# Patient Record
Sex: MALE | Race: BLACK OR AFRICAN AMERICAN | Employment: FULL TIME | URBAN - METROPOLITAN AREA
[De-identification: names, ages, dates, MRNs, and addresses within clinical notes are randomized per-mention and may not be internally consistent; named-entity substitution may affect disease eponyms.]

---

## 2018-11-11 ENCOUNTER — HOSPITAL ENCOUNTER (EMERGENCY)
Age: 23
Discharge: HOME | End: 2018-11-11
Attending: EMERGENCY MEDICINE

## 2018-11-11 VITALS
TEMPERATURE: 98.9 F | OXYGEN SATURATION: 98 % | BODY MASS INDEX: 26.61 KG/M2 | WEIGHT: 200.8 LBS | RESPIRATION RATE: 16 BRPM | SYSTOLIC BLOOD PRESSURE: 131 MMHG | DIASTOLIC BLOOD PRESSURE: 78 MMHG | HEART RATE: 68 BPM | HEIGHT: 73 IN

## 2018-11-11 DIAGNOSIS — J40 BRONCHITIS: Primary | ICD-10-CM

## 2018-11-11 PROCEDURE — 99282 EMERGENCY DEPT VISIT SF MDM: CPT

## 2018-11-11 RX ORDER — BENZONATATE 100 MG/1
100 CAPSULE ORAL 3 TIMES DAILY PRN
Qty: 21 CAPSULE | Refills: 0 | Status: SHIPPED | OUTPATIENT
Start: 2018-11-11 | End: 2018-11-18

## 2018-11-11 NOTE — ED PROVIDER NOTES
CHIEF COMPLAINT  Cough      HISTORY OF PRESENT ILLNESS  Nilton Kelley is a 20 y.o. male, who presents to the ED with several day history of cough productive of clear sputum with symptoms worse at night not associated with chest pain wheezing or shortness of breath. No fever mild sore throat initially no earache. No prior history of asthma or use of an inhaler. Review of Systems    I have reviewed the following from the nursing documentation. Past Medical History:   Diagnosis Date    Wrist fracture, left     hx of casting     History reviewed. No pertinent surgical history. History reviewed. No pertinent family history. Social History     Social History    Marital status: Single     Spouse name: N/A    Number of children: N/A    Years of education: N/A     Occupational History    Not on file. Social History Main Topics    Smoking status: Current Every Day Smoker     Types: Cigars    Smokeless tobacco: Never Used    Alcohol use Yes      Comment: occ    Drug use: No    Sexual activity: Yes     Partners: Female     Other Topics Concern    Not on file     Social History Narrative    No narrative on file     No current facility-administered medications for this encounter. Current Outpatient Prescriptions   Medication Sig Dispense Refill    benzonatate (TESSALON PERLES) 100 MG capsule Take 1 capsule by mouth 3 times daily as needed for Cough 21 capsule 0    HYDROcodone-acetaminophen (NORCO) 5-325 MG per tablet Take 1 tablet by mouth every 6 hours as needed for Pain. 15 tablet 0     No Known Allergies  Review of Systems       PHYSICAL EXAM  /78   Pulse 68   Temp 98.9 °F (37.2 °C) (Oral)   Resp 16   Ht 6' 1\" (1.854 m)   Wt 91.1 kg (200 lb 12.8 oz)   SpO2 98%   BMI 26.49 kg/m²   GENERAL APPEARANCE: Awake and alert. Cooperative. In no acute distress. EYES: PERRL. Corneas clear. Sclera non icteric. No conjunctival injection  ENT: Oropharynx mild pharyngeal erythema no exudates.

## 2020-07-30 ENCOUNTER — HOSPITAL ENCOUNTER (EMERGENCY)
Age: 25
Discharge: HOME | End: 2020-07-30
Attending: EMERGENCY MEDICINE

## 2020-07-30 VITALS
BODY MASS INDEX: 27.44 KG/M2 | HEART RATE: 69 BPM | WEIGHT: 208 LBS | SYSTOLIC BLOOD PRESSURE: 128 MMHG | RESPIRATION RATE: 17 BRPM | TEMPERATURE: 99 F | DIASTOLIC BLOOD PRESSURE: 69 MMHG | OXYGEN SATURATION: 99 %

## 2020-07-30 PROCEDURE — 6370000000 HC RX 637 (ALT 250 FOR IP): Performed by: EMERGENCY MEDICINE

## 2020-07-30 PROCEDURE — 99282 EMERGENCY DEPT VISIT SF MDM: CPT

## 2020-07-30 RX ADMIN — CARBAMIDE PEROXIDE 6.5% 5 DROP: 6.5 LIQUID AURICULAR (OTIC) at 10:33

## 2020-07-30 NOTE — ED NOTES
1202: Pt seen walking out of Emergency Department by nurse and , patient left department, did not want to wait.      Jennifer Zimmer RN  07/30/20 8136

## 2020-07-30 NOTE — ED PROVIDER NOTES
CHIEF COMPLAINT  Otalgia (Pt c/o L ear fullness x 2-3 days. Pt c/o difficulty hearing.)      HISTORY OF PRESENT ILLNESS  Malinda Craft  is a 30 y.o. male who presents to the ED at via private vehicle complaining of otalgia. Patient reports that he has had left ear fullness for the last 2 or 3 days. Decreased hearing has been noted. Patient denies pain, drainage, or redness. No fevers or chills night. There are no other complaints, modifying factors or associated symptoms. Nursing notes reviewed. Past medical history:  has a past medical history of Wrist fracture, left. Past surgical history:  has no past surgical history on file. Home medications:   Prior to Admission medications    Not on File       No Known Allergies    Social history:  reports that he has been smoking cigars. He has never used smokeless tobacco. He reports current alcohol use. He reports that he does not use drugs. Family history:  History reviewed. No pertinent family history. REVIEW OF SYSTEMS  6 systems reviewed, pertinent positives per HPI otherwise noted to be negative    PHYSICAL EXAM  Vitals:    07/30/20 1010   BP: 128/69   Pulse: 69   Resp: 17   Temp: 99 °F (37.2 °C)   SpO2: 99%       GENERAL: Patient is well-developed, well-nourished,  no acute distress. Minimal apparent discomfort. Non toxic appearing. HEENT:  Normocephalic, atraumatic. PERRL. Conjunctiva appear normal.  External ears are normal.  Cerumen impaction bilaterally. Unable to visualize TMs.  MMM  NECK: Supple with normal ROM. Trachea midline  LUNGS:  Normal work of breathing. Speaking comfortably in full sentences. EXTREMITIES: 2+ distal pulses w/o edema. MUSCULOSKELETAL:  Atraumatic extremities with normal ROM grossly. No obvious bony deformities. SKIN: Warm/dry. No rashes/lesions noted. PSYCHIATRIC: Patient is alert and oriented with normal affect  NEUROLOGIC: Cranial nerves grossly intact.  Moves all extremities with equal strength. No gross sensory deficits. Answers questions/follows commands appropriately. ED COURSE/MDM  Nursing notes reviewed. Pt was given the following medications or treatments in the ED:              Cerumen disimpaction completed by nursing staff using Debrox as noted. Reevaluation:    12 PM: Easily able to visualize the left TM at this time. No evidence of erythema. Right TM is still somewhat obscured. Upon discussion with patient, patient became quite irritable stating \"I just came in to have you clean out my ears. All you did was put drops in my ears that I have at home. You are taking $1000 from me and not doing F)*&ing anything. I do not know why I came here. \"I attempted to console patient stating that I did not need to scrape out any wax as the solution had already dissolved it and that I could now see his tympanic membrane. Patient continued to be irritable and left prior to receiving discharge instructions. Clinical Impression  Based on the presenting complaint, history, and physical exam, multiple diagnoses were considered. Exam and workup here most c/w:  1. Bilateral impacted cerumen        I discussed with Nathanael Sims the results of evaluation in the ED, diagnosis, care, and prognosis. The plan is to discharge to home. Patient is in agreement with plan and questions have been answered. I also discussed with Javier Holland the reasons which may require a return visit and the importance of follow-up care. The patient is well-appearing, nontoxic, and improved at the time of discharge. Patient agrees to call to arrange follow-up care as directed. Javier Holland understands to return immediately for worsening/change in symptoms. Patient will be started on the following medications from the ED:  New Prescriptions    No medications on file         Disposition  Pt is discharged in stable condition.     Disposition Vitals:  /69   Pulse 69   Temp 99 °F (37.2 °C) (Oral)   Resp 17   Wt 208 lb (94.3 kg)   SpO2 99%   BMI 27.44 kg/m²                    Norm Milling, DO  07/30/20 1201

## 2020-07-31 ENCOUNTER — CARE COORDINATION (OUTPATIENT)
Dept: CASE MANAGEMENT | Age: 25
End: 2020-07-31

## 2020-07-31 NOTE — CARE COORDINATION
Ambulatory Care Manager contacted the patient by telephone to perform post discharge assessment. Verified name and  with patient as identifiers. Discussed COVID-19  testing which was not done at this time. Patient has following risk factors of: no known risk factors. Patient seen at [facility] ED and diagnosed with ear wax blockage. Patient continues to c/o fullness in his ears and reports the ear wax is not removed. Offered to contact [facility] clinic and the  informed this ACM they are no longer scheduling appointments if the patient is not established with a [place] [address] PCP. Patient informed and this ACM offered assistance with establishing with a PCP; patient declined. Patient given [facility] patient advocate number. ACM reviewed discharge instructions, medical action plan and red flags related to discharge diagnosis. Reviewed and educated them on any new and changed medications related to discharge diagnosis; there are no new medication changes or new prescriptions ordered. Patient informs this ACM he works as a  at [employer] and thanked patient for his services  Education provided regarding infection prevention, and signs and symptoms of COVID-19 and when to seek medical attention with patient who verbalized understanding. Discussed exposure protocols and [facility] you at higher risk for severe illness 2019 and given an opportunity for questions and concerns. During call the phone disconnected; this ACM called back and completed education. Provided education and encouraged patient to down load the [employer] / Linda  Patient agreed for this ACM to send the following via text: The patient hotline contact numbers and the information for the [employer]   Attempted to call patient back the second time phone disconnected. This ACM left contact information for future reference. From CDC: Are you at higher risk for severe illness?     [facility] Wash your hands often.  Avoid close contact (6 feet, which is about two arm lengths) with people who are sick.  Put distance between yourself and other people if COVID-19 is spreading in your community.  Clean and disinfect frequently touched surfaces.  Avoid all cruise travel and non-essential air travel.  Call your healthcare professional if you have concerns about COVID-19 and your underlying condition or if you are sick. For more information on steps you can take to protect yourself, see CDC's How to [address] [place] [address] for Follow up in 14 days.

## 2020-08-14 ENCOUNTER — CARE COORDINATION (OUTPATIENT)
Dept: CASE MANAGEMENT | Age: 25
End: 2020-08-14